# Patient Record
Sex: FEMALE | URBAN - METROPOLITAN AREA
[De-identification: names, ages, dates, MRNs, and addresses within clinical notes are randomized per-mention and may not be internally consistent; named-entity substitution may affect disease eponyms.]

---

## 2025-04-29 ENCOUNTER — APPOINTMENT (OUTPATIENT)
Dept: PRIMARY CARE | Facility: CLINIC | Age: 55
End: 2025-04-29
Payer: COMMERCIAL

## 2025-04-29 VITALS
HEIGHT: 67 IN | SYSTOLIC BLOOD PRESSURE: 121 MMHG | HEART RATE: 68 BPM | DIASTOLIC BLOOD PRESSURE: 80 MMHG | WEIGHT: 161 LBS | OXYGEN SATURATION: 98 % | BODY MASS INDEX: 25.27 KG/M2

## 2025-04-29 DIAGNOSIS — Z00.00 ROUTINE GENERAL MEDICAL EXAMINATION AT A HEALTH CARE FACILITY: ICD-10-CM

## 2025-04-29 DIAGNOSIS — Z12.11 COLON CANCER SCREENING: ICD-10-CM

## 2025-04-29 DIAGNOSIS — L74.519 EXCESSIVE SWEATING, LOCAL: ICD-10-CM

## 2025-04-29 DIAGNOSIS — N95.1 MENOPAUSAL SYNDROME (HOT FLUSHES): Primary | ICD-10-CM

## 2025-04-29 PROCEDURE — 3008F BODY MASS INDEX DOCD: CPT | Performed by: FAMILY MEDICINE

## 2025-04-29 PROCEDURE — 1036F TOBACCO NON-USER: CPT | Performed by: FAMILY MEDICINE

## 2025-04-29 PROCEDURE — 99396 PREV VISIT EST AGE 40-64: CPT | Performed by: FAMILY MEDICINE

## 2025-04-29 RX ORDER — LEVOTHYROXINE SODIUM 50 UG/1
50 TABLET ORAL DAILY
COMMUNITY

## 2025-04-29 RX ORDER — CITALOPRAM 10 MG/1
10 TABLET ORAL DAILY
Qty: 30 TABLET | Refills: 1 | Status: SHIPPED | OUTPATIENT
Start: 2025-04-29 | End: 2025-06-28

## 2025-04-29 RX ORDER — MULTIVITAMIN
1 TABLET ORAL DAILY
COMMUNITY

## 2025-04-29 ASSESSMENT — PATIENT HEALTH QUESTIONNAIRE - PHQ9
7. TROUBLE CONCENTRATING ON THINGS, SUCH AS READING THE NEWSPAPER OR WATCHING TELEVISION: NOT AT ALL
SUM OF ALL RESPONSES TO PHQ9 QUESTIONS 1 AND 2: 0
8. MOVING OR SPEAKING SO SLOWLY THAT OTHER PEOPLE COULD HAVE NOTICED. OR THE OPPOSITE, BEING SO FIGETY OR RESTLESS THAT YOU HAVE BEEN MOVING AROUND A LOT MORE THAN USUAL: NOT AT ALL
5. POOR APPETITE OR OVEREATING: NOT AT ALL
10. IF YOU CHECKED OFF ANY PROBLEMS, HOW DIFFICULT HAVE THESE PROBLEMS MADE IT FOR YOU TO DO YOUR WORK, TAKE CARE OF THINGS AT HOME, OR GET ALONG WITH OTHER PEOPLE: NOT DIFFICULT AT ALL
SUM OF ALL RESPONSES TO PHQ QUESTIONS 1-9: 2
2. FEELING DOWN, DEPRESSED OR HOPELESS: NOT AT ALL
9. THOUGHTS THAT YOU WOULD BE BETTER OFF DEAD, OR OF HURTING YOURSELF: NOT AT ALL
6. FEELING BAD ABOUT YOURSELF - OR THAT YOU ARE A FAILURE OR HAVE LET YOURSELF OR YOUR FAMILY DOWN: NOT AT ALL
1. LITTLE INTEREST OR PLEASURE IN DOING THINGS: NOT AT ALL
3. TROUBLE FALLING OR STAYING ASLEEP OR SLEEPING TOO MUCH: NOT AT ALL
4. FEELING TIRED OR HAVING LITTLE ENERGY: MORE THAN HALF THE DAYS

## 2025-04-29 NOTE — PROGRESS NOTES
"Patient ID: Cecelia Matias [id number] is a 54 y.o. female who presents for Establish Care (New patient to establish care).      Pt is here to establish care and Physical.  Patient is vegetarian  Walking every day all also doing Pilates  Mother lives in [facility].  Patient is in [place] for 6-month and goes to [facility] for 6-month to take care of her mother  Currently taking levothyroxine 50 for hypothyroidism  And vitamin D supplement  Denies smoking alcohol or drug use  Advised to get Prevnar 20 and Shingrix vaccine  Patient going to talk to insurance regarding Prevnar 20  Declined colonoscopy  Okay to get cologuard  Patient had Pap smear in [place] last year per patient  Mammogram  Pt is post menopausal for last 4-year patient noticing increasing hot flashes and sweats.  Difficulty staying asleep waking up 3 in the morning every day    Immunization History   Administered Date(s) Administered    COVID-19, mRNA, LNP-S, PF, 30 mcg/0.3 mL dose 02/25/2021, 03/18/2021, 12/14/2021    Flu vaccine, quadrivalent, no egg protein, age 6 month or greater (FLUCELVAX) 10/13/2020, 09/16/2023    Flu vaccine, quadrivalent, recombinant, preservative free, adult (FLUBLOK) 10/03/2021, 11/21/2022    Flu vaccine, trivalent, preservative free, no egg protein, age 6 months or greater (Flucelvax) 09/22/2024    Influenza, Unspecified 10/31/2005, 11/03/2019    Influenza, injectable, quadrivalent 09/20/2015, 10/25/2016    Tdap vaccine, age 7 year and older (BOOSTRIX, ADACEL) 10/25/2016       Allergies[1]    Current Medications[2]    Medical History[3]  Social History[4]  Family History[5]    /80   Pulse 68   Ht 1.702 m (5' 7\")   Wt 73 kg (161 lb)   SpO2 98%   BMI 25.22 kg/m²   Review of System  Constitutional:  Negative for appetite change, chills, fatigue, fever and unexpected weight change.   HENT:  Negative for hearing loss and voice change.    Eyes:  Negative for visual disturbance.   Respiratory:  Negative for cough, chest " "tightness, shortness of breath and wheezing.    Cardiovascular:  Negative for chest pain and leg swelling.   Gastrointestinal:  Negative for abdominal pain, blood in stool, constipation and diarrhea.   Genitourinary:  Negative for difficulty urinating and dysuria.   Musculoskeletal:  Negative for arthralgias and joint swelling.   Skin:  Negative for rash.   Neurological:  Negative for dizziness, weakness, numbness and headaches.   Psychiatric/Behavioral:  Negative for behavioral problems and confusion.    /80   Pulse 68   Ht 1.702 m (5' 7\")   Wt 73 kg (161 lb)   SpO2 98%   BMI 25.22 kg/m²     General Appearance:    Alert, cooperative, no distress, appears stated age   Head:    Normocephalic, without obvious abnormality, atraumatic   Eyes:    PERRL, conjunctiva/corneas clear, EOM's intact,    Neck:   Supple, symmetrical, No enlargement   Back:     Symmetric, no curvature, ROM normal, no CVA tenderness   Lungs:     Clear to auscultation bilaterally, respirations normal ,no wheezing or ronchi   Chest Wall:    No tenderness or deformity   Abdomen:     Soft, non-tender, bowel sounds active all four quadrants, no masses,   no organomegaly   Extremities:   Extremities normal, atraumatic, no cyanosis or edema   Pulses:   2+ and symmetric all extremities   Skin:   Skin color, texture, turgor normal, no rashes or lesions   Lymph nodes:   Cervical nodes normal   Neurologic:    normal strength, sensation and reflexes.          Assessment/Plan   Diagnoses and all orders for this visit:  Menopausal syndrome (hot flushes)  -     Triiodothyronine, Free; Future  -     citalopram (CeleXA) 10 mg tablet; Take 1 tablet (10 mg) by mouth once daily.  Routine general medical examination at a health care facility  -     TSH with reflex to Free T4 if abnormal; Future  -     Lipid Panel; Future  -     Comprehensive Metabolic Panel; Future  -     CBC; Future  -     Urinalysis with Reflex Microscopic; Future  -     BI mammo bilateral " screening tomosynthesis; Future  Excessive sweating, local  -     citalopram (CeleXA) 10 mg tablet; Take 1 tablet (10 mg) by mouth once daily.  Colon cancer screening  -     Cologuard® colon cancer screening; Future    Continue vegetarian diet and daily exercise  Will get complete blood work  Patient was advised to take B12 1000 mcg daily and vitamin D 2000 international unit daily  Get Cologuard  Get mammogram  Concern for postmenopausal hot flashes and sweats  Will start Celexa 10  Patient was also given information about [facility].  Patient going to talk to insurance and let me know.      Follow-up 1 month         [1]   Allergies  Allergen Reactions    Bee Venom Protein (Honey Bee) Anaphylaxis    Penicillins Rash   [2]   Current Outpatient Medications   Medication Sig Dispense Refill    cholecalciferol, vitamin D3, (VITAMIN D3 ORAL) Take 1 tablet by mouth once daily.      levothyroxine (Synthroid, Levoxyl) 50 mcg tablet Take 1 tablet (50 mcg) by mouth early in the morning.. Take on an empty stomach at the same time each day, either 30 to 60 minutes prior to breakfast      multivitamin tablet Take 1 tablet by mouth once daily.       No current facility-administered medications for this visit.   [3]   Past Medical History:  Diagnosis Date    Disease of thyroid gland    [4]   Social History  Tobacco Use    Smoking status: Never    Smokeless tobacco: Never   Vaping Use    Vaping status: Never Used   Substance Use Topics    Alcohol use: Yes    Drug use: Never   [5]   Family History  Problem Relation Name Age of Onset    Hypertension Father

## 2025-06-12 ENCOUNTER — APPOINTMENT (OUTPATIENT)
Dept: PRIMARY CARE | Facility: CLINIC | Age: 55
End: 2025-06-12
Payer: COMMERCIAL